# Patient Record
Sex: MALE | Race: WHITE
[De-identification: names, ages, dates, MRNs, and addresses within clinical notes are randomized per-mention and may not be internally consistent; named-entity substitution may affect disease eponyms.]

---

## 2018-01-08 ENCOUNTER — HOSPITAL ENCOUNTER (OUTPATIENT)
Dept: HOSPITAL 90 - SHCH | Age: 83
Discharge: HOME | End: 2018-01-08
Attending: INTERNAL MEDICINE
Payer: MEDICARE

## 2018-01-08 DIAGNOSIS — I35.0: Primary | ICD-10-CM

## 2018-01-08 PROCEDURE — 93306 TTE W/DOPPLER COMPLETE: CPT

## 2019-01-22 ENCOUNTER — HOSPITAL ENCOUNTER (OUTPATIENT)
Dept: HOSPITAL 90 - SHCH | Age: 84
Discharge: HOME | End: 2019-01-22
Attending: INTERNAL MEDICINE
Payer: MEDICARE

## 2019-01-22 DIAGNOSIS — I35.0: Primary | ICD-10-CM

## 2019-01-22 DIAGNOSIS — I51.7: ICD-10-CM

## 2019-01-22 DIAGNOSIS — I70.0: ICD-10-CM

## 2019-01-22 PROCEDURE — 93306 TTE W/DOPPLER COMPLETE: CPT

## 2019-04-03 VITALS — SYSTOLIC BLOOD PRESSURE: 105 MMHG | DIASTOLIC BLOOD PRESSURE: 51 MMHG

## 2019-04-03 LAB
APTT PPP: 26.1 SEC (ref 26.3–35.5)
BASOPHILS NFR BLD AUTO: 0.4 % (ref 0–5)
BUN SERPL-MCNC: 11 MG/DL (ref 7–18)
CHLORIDE SERPL-SCNC: 104 MMOL/L (ref 101–111)
CO2 SERPL-SCNC: 30 MMOL/L (ref 21–32)
CREAT SERPL-MCNC: 0.9 MG/DL (ref 0.5–1.5)
EOSINOPHIL NFR BLD AUTO: 3.9 % (ref 0–8)
ERYTHROCYTE [DISTWIDTH] IN BLOOD BY AUTOMATED COUNT: 13.1 % (ref 11–15.5)
GFR SERPL CREATININE-BSD FRML MDRD: 86 ML/MIN (ref 60–?)
GLUCOSE SERPL-MCNC: 214 MG/DL (ref 70–105)
GLUCOSE UR STRIP-MCNC: 250 MG/DL
HCT VFR BLD AUTO: 41 % (ref 42–54)
INR PPP: 1.02 (ref 0.85–1.15)
LYMPHOCYTES NFR SPEC AUTO: 24.2 % (ref 21–51)
MCH RBC QN AUTO: 33.7 PG (ref 27–33)
MCHC RBC AUTO-ENTMCNC: 33.6 G/DL (ref 32–36)
MCV RBC AUTO: 100.3 FL (ref 79–99)
MONOCYTES NFR BLD AUTO: 7.2 % (ref 3–13)
NEUTROPHILS NFR BLD AUTO: 64.3 % (ref 40–77)
NRBC BLD MANUAL-RTO: 0 % (ref 0–0.19)
PH UR STRIP: 6 [PH] (ref 5–8)
PLATELET # BLD AUTO: 237 K/UL (ref 130–400)
POTASSIUM SERPL-SCNC: 4.1 MMOL/L (ref 3.5–5.1)
PROTHROMBIN TIME: 10.7 SEC (ref 9.6–11.6)
RBC # BLD AUTO: 4.08 MIL/UL (ref 4.5–6.2)
RBC #/AREA URNS HPF: (no result) /HPF (ref 0–1)
SODIUM SERPL-SCNC: 141 MMOL/L (ref 136–145)
SP GR UR STRIP: 1.01 (ref 1–1.03)
UROBILINOGEN UR STRIP-MCNC: 0.2 MG/DL (ref 0.2–1)
WBC # BLD AUTO: 6.8 K/UL (ref 4.8–10.8)
WBC #/AREA URNS HPF: (no result) /HPF (ref 0–1)

## 2019-04-05 ENCOUNTER — HOSPITAL ENCOUNTER (OUTPATIENT)
Dept: HOSPITAL 90 - DAH | Age: 84
Discharge: HOME | End: 2019-04-05
Attending: INTERNAL MEDICINE
Payer: MEDICARE

## 2019-04-05 VITALS — DIASTOLIC BLOOD PRESSURE: 54 MMHG | SYSTOLIC BLOOD PRESSURE: 115 MMHG

## 2019-04-05 VITALS — DIASTOLIC BLOOD PRESSURE: 63 MMHG | SYSTOLIC BLOOD PRESSURE: 119 MMHG

## 2019-04-05 VITALS — SYSTOLIC BLOOD PRESSURE: 114 MMHG | DIASTOLIC BLOOD PRESSURE: 54 MMHG

## 2019-04-05 VITALS — SYSTOLIC BLOOD PRESSURE: 125 MMHG | DIASTOLIC BLOOD PRESSURE: 56 MMHG

## 2019-04-05 VITALS — SYSTOLIC BLOOD PRESSURE: 120 MMHG | DIASTOLIC BLOOD PRESSURE: 57 MMHG

## 2019-04-05 VITALS — SYSTOLIC BLOOD PRESSURE: 115 MMHG | DIASTOLIC BLOOD PRESSURE: 57 MMHG

## 2019-04-05 VITALS — SYSTOLIC BLOOD PRESSURE: 118 MMHG | DIASTOLIC BLOOD PRESSURE: 60 MMHG

## 2019-04-05 VITALS — SYSTOLIC BLOOD PRESSURE: 110 MMHG | DIASTOLIC BLOOD PRESSURE: 56 MMHG

## 2019-04-05 VITALS — WEIGHT: 166.2 LBS | BODY MASS INDEX: 26.09 KG/M2 | HEIGHT: 67 IN

## 2019-04-05 VITALS — SYSTOLIC BLOOD PRESSURE: 123 MMHG | DIASTOLIC BLOOD PRESSURE: 56 MMHG

## 2019-04-05 VITALS — DIASTOLIC BLOOD PRESSURE: 61 MMHG | SYSTOLIC BLOOD PRESSURE: 130 MMHG

## 2019-04-05 VITALS — DIASTOLIC BLOOD PRESSURE: 63 MMHG | SYSTOLIC BLOOD PRESSURE: 125 MMHG

## 2019-04-05 VITALS — DIASTOLIC BLOOD PRESSURE: 66 MMHG | SYSTOLIC BLOOD PRESSURE: 137 MMHG

## 2019-04-05 DIAGNOSIS — E78.5: ICD-10-CM

## 2019-04-05 DIAGNOSIS — Z79.01: ICD-10-CM

## 2019-04-05 DIAGNOSIS — Z79.899: ICD-10-CM

## 2019-04-05 DIAGNOSIS — I25.118: Primary | ICD-10-CM

## 2019-04-05 DIAGNOSIS — I35.0: ICD-10-CM

## 2019-04-05 DIAGNOSIS — Z95.5: ICD-10-CM

## 2019-04-05 DIAGNOSIS — I25.5: ICD-10-CM

## 2019-04-05 DIAGNOSIS — F17.210: ICD-10-CM

## 2019-04-05 DIAGNOSIS — Z98.890: ICD-10-CM

## 2019-04-05 PROCEDURE — 93005 ELECTROCARDIOGRAM TRACING: CPT

## 2019-04-05 PROCEDURE — 80048 BASIC METABOLIC PNL TOTAL CA: CPT

## 2019-04-05 PROCEDURE — 85730 THROMBOPLASTIN TIME PARTIAL: CPT

## 2019-04-05 PROCEDURE — 81001 URINALYSIS AUTO W/SCOPE: CPT

## 2019-04-05 PROCEDURE — 71045 X-RAY EXAM CHEST 1 VIEW: CPT

## 2019-04-05 PROCEDURE — 36415 COLL VENOUS BLD VENIPUNCTURE: CPT

## 2019-04-05 PROCEDURE — 85610 PROTHROMBIN TIME: CPT

## 2019-04-05 PROCEDURE — 99157 MOD SED OTHER PHYS/QHP EA: CPT

## 2019-04-05 PROCEDURE — 85025 COMPLETE CBC W/AUTO DIFF WBC: CPT

## 2019-04-05 PROCEDURE — 99156 MOD SED OTH PHYS/QHP 5/>YRS: CPT

## 2019-04-05 PROCEDURE — 93460 R&L HRT ART/VENTRICLE ANGIO: CPT

## 2019-04-05 NOTE — NUR
RESUME

RESUMED CARE OF PATIENT FROM CRYSTAL PARMAR RN. PT LYING IN BED, NO DISTRESS NOTED. DSTAT 
DRESSING DRY AND INTACT TO RIGHT GROIN

## 2019-04-05 NOTE — NUR
DC

DC INSTRUCTIONS GIVEN TO PT/PTS WIFE , INSTRUCTED TO F/U WITH DR. QUIROGA , AND [place] [facility] [place] AT 10 AM FOR CTA/TAVR PROTOCOL, TO  KEEP NPO AFTER [place] ON 4-9-19 FOR 
PROCEDURE. , PIV REMOVED TO LEFT ARM. SMALL BRUISING NOTED TO ARM AFTER PRESSURED APPLIED TO 
SITE.  PT / SPOUSE VERBALIZED UNDERSTANDING. RIGHT GROIN DSTAT DRESSING DRY AND INTACT,

## 2019-04-10 ENCOUNTER — HOSPITAL ENCOUNTER (OUTPATIENT)
Dept: HOSPITAL 90 - RAH | Age: 84
Discharge: HOME | End: 2019-04-10
Attending: INTERNAL MEDICINE
Payer: MEDICARE

## 2019-04-10 DIAGNOSIS — N32.9: ICD-10-CM

## 2019-04-10 DIAGNOSIS — E04.9: ICD-10-CM

## 2019-04-10 DIAGNOSIS — Z90.49: ICD-10-CM

## 2019-04-10 DIAGNOSIS — I70.0: ICD-10-CM

## 2019-04-10 DIAGNOSIS — N40.0: Primary | ICD-10-CM

## 2019-04-10 PROCEDURE — 74174 CTA ABD&PLVS W/CONTRAST: CPT

## 2019-04-10 PROCEDURE — 75574 CT ANGIO HRT W/3D IMAGE: CPT

## 2019-07-25 ENCOUNTER — HOSPITAL ENCOUNTER (OUTPATIENT)
Dept: HOSPITAL 90 - EDH | Age: 84
Setting detail: OBSERVATION
LOS: 1 days | Discharge: HOME | End: 2019-07-26
Attending: INTERNAL MEDICINE | Admitting: INTERNAL MEDICINE
Payer: MEDICARE

## 2019-07-25 VITALS — SYSTOLIC BLOOD PRESSURE: 116 MMHG | DIASTOLIC BLOOD PRESSURE: 67 MMHG

## 2019-07-25 VITALS — WEIGHT: 158.1 LBS | HEIGHT: 68 IN | BODY MASS INDEX: 23.96 KG/M2

## 2019-07-25 VITALS — SYSTOLIC BLOOD PRESSURE: 111 MMHG | DIASTOLIC BLOOD PRESSURE: 53 MMHG

## 2019-07-25 DIAGNOSIS — Z96.1: ICD-10-CM

## 2019-07-25 DIAGNOSIS — N40.1: ICD-10-CM

## 2019-07-25 DIAGNOSIS — Z79.899: ICD-10-CM

## 2019-07-25 DIAGNOSIS — Z87.891: ICD-10-CM

## 2019-07-25 DIAGNOSIS — E07.9: ICD-10-CM

## 2019-07-25 DIAGNOSIS — I25.10: ICD-10-CM

## 2019-07-25 DIAGNOSIS — H93.13: ICD-10-CM

## 2019-07-25 DIAGNOSIS — C67.4: Primary | ICD-10-CM

## 2019-07-25 DIAGNOSIS — H04.123: ICD-10-CM

## 2019-07-25 DIAGNOSIS — Z97.4: ICD-10-CM

## 2019-07-25 DIAGNOSIS — I35.0: ICD-10-CM

## 2019-07-25 LAB — HCT VFR BLD AUTO: 23 % (ref 42–54)

## 2019-07-25 PROCEDURE — 99284 EMERGENCY DEPT VISIT MOD MDM: CPT

## 2019-07-25 PROCEDURE — 86922 COMPATIBILITY TEST ANTIGLOB: CPT

## 2019-07-25 PROCEDURE — 86900 BLOOD TYPING SEROLOGIC ABO: CPT

## 2019-07-25 PROCEDURE — 86901 BLOOD TYPING SEROLOGIC RH(D): CPT

## 2019-07-25 PROCEDURE — 85014 HEMATOCRIT: CPT

## 2019-07-25 PROCEDURE — 85027 COMPLETE CBC AUTOMATED: CPT

## 2019-07-25 PROCEDURE — 86850 RBC ANTIBODY SCREEN: CPT

## 2019-07-25 PROCEDURE — 36415 COLL VENOUS BLD VENIPUNCTURE: CPT

## 2019-07-25 PROCEDURE — 36430 TRANSFUSION BLD/BLD COMPNT: CPT

## 2019-07-25 PROCEDURE — 85018 HEMOGLOBIN: CPT

## 2019-07-26 VITALS — SYSTOLIC BLOOD PRESSURE: 106 MMHG | DIASTOLIC BLOOD PRESSURE: 58 MMHG

## 2019-07-26 VITALS — DIASTOLIC BLOOD PRESSURE: 64 MMHG | SYSTOLIC BLOOD PRESSURE: 105 MMHG

## 2019-07-26 VITALS — SYSTOLIC BLOOD PRESSURE: 106 MMHG | DIASTOLIC BLOOD PRESSURE: 60 MMHG

## 2019-07-26 VITALS — SYSTOLIC BLOOD PRESSURE: 109 MMHG | DIASTOLIC BLOOD PRESSURE: 56 MMHG

## 2019-07-26 LAB
ERYTHROCYTE [DISTWIDTH] IN BLOOD BY AUTOMATED COUNT: 15 % (ref 11–15.5)
HCT VFR BLD AUTO: 29.8 % (ref 42–54)
MCH RBC QN AUTO: 32.7 PG (ref 27–33)
MCHC RBC AUTO-ENTMCNC: 35.2 G/DL (ref 32–36)
MCV RBC AUTO: 93 FL (ref 79–99)
NRBC BLD MANUAL-RTO: 0 % (ref 0–0.19)
PLAT MORPH BLD: (no result)
PLATELET # BLD AUTO: 50 K/UL (ref 130–400)
RBC # BLD AUTO: 3.2 MIL/UL (ref 4.5–6.2)
WBC # BLD AUTO: 3.7 K/UL (ref 4.8–10.8)

## 2019-07-26 NOTE — NUR
DISCHARGE INSTRUCTIONS GIVEN. PATIENT IV DISCONTINUED WITH INNER CANNULA INTACT / ALL 
QUESTIONS ANSWERED. PATIENT TO FOLLOW UP WITH DR. ADAMS.

## 2020-04-09 ENCOUNTER — HOSPITAL ENCOUNTER (OUTPATIENT)
Dept: HOSPITAL 90 - SHCH | Age: 85
Discharge: HOME | End: 2020-04-09
Attending: INTERNAL MEDICINE
Payer: MEDICARE

## 2020-04-09 DIAGNOSIS — I35.0: ICD-10-CM

## 2020-04-09 DIAGNOSIS — Z95.2: Primary | ICD-10-CM

## 2020-04-09 PROCEDURE — 93306 TTE W/DOPPLER COMPLETE: CPT

## 2020-05-05 ENCOUNTER — HOSPITAL ENCOUNTER (OUTPATIENT)
Dept: HOSPITAL 90 - RAH | Age: 85
Discharge: HOME | End: 2020-05-05
Attending: UROLOGY
Payer: MEDICARE

## 2020-05-05 DIAGNOSIS — K57.30: ICD-10-CM

## 2020-05-05 DIAGNOSIS — K76.0: Primary | ICD-10-CM

## 2020-05-05 DIAGNOSIS — I70.0: ICD-10-CM

## 2020-05-05 DIAGNOSIS — N32.89: ICD-10-CM

## 2020-05-05 DIAGNOSIS — Z85.51: ICD-10-CM

## 2020-05-05 DIAGNOSIS — R91.1: ICD-10-CM

## 2020-05-05 DIAGNOSIS — M47.819: ICD-10-CM

## 2020-05-05 PROCEDURE — 74177 CT ABD & PELVIS W/CONTRAST: CPT

## 2020-10-16 ENCOUNTER — HOSPITAL ENCOUNTER (OUTPATIENT)
Dept: HOSPITAL 90 - SHCH | Age: 85
Discharge: HOME | End: 2020-10-16
Attending: INTERNAL MEDICINE
Payer: MEDICARE

## 2020-10-16 DIAGNOSIS — I10: ICD-10-CM

## 2020-10-16 DIAGNOSIS — I34.0: Primary | ICD-10-CM

## 2020-10-16 PROCEDURE — 93356 MYOCRD STRAIN IMG SPCKL TRCK: CPT

## 2020-10-16 PROCEDURE — 93306 TTE W/DOPPLER COMPLETE: CPT

## 2020-10-27 LAB
APTT PPP: 23.2 SEC (ref 26.3–35.5)
BASOPHILS NFR BLD AUTO: 0.3 % (ref 0–5)
BUN SERPL-MCNC: 10 MG/DL (ref 7–18)
CHLORIDE SERPL-SCNC: 105 MMOL/L (ref 101–111)
CO2 SERPL-SCNC: 31 MMOL/L (ref 21–32)
CREAT SERPL-MCNC: 1.1 MG/DL (ref 0.5–1.5)
EOSINOPHIL NFR BLD AUTO: 2.5 % (ref 0–8)
ERYTHROCYTE [DISTWIDTH] IN BLOOD BY AUTOMATED COUNT: 12.5 % (ref 11–15.5)
GFR SERPL CREATININE-BSD FRML MDRD: 68 ML/MIN (ref 60–?)
GLUCOSE SERPL-MCNC: 186 MG/DL (ref 70–105)
HCT VFR BLD AUTO: 38.1 % (ref 42–54)
INR PPP: 1.01 (ref 0.85–1.15)
LYMPHOCYTES NFR SPEC AUTO: 26.4 % (ref 21–51)
MCH RBC QN AUTO: 34.4 PG (ref 27–33)
MCHC RBC AUTO-ENTMCNC: 33.9 G/DL (ref 32–36)
MCV RBC AUTO: 101.6 FL (ref 79–99)
MONOCYTES NFR BLD AUTO: 7.6 % (ref 3–13)
NEUTROPHILS NFR BLD AUTO: 62.9 % (ref 40–77)
NRBC BLD MANUAL-RTO: 0 % (ref 0–0.19)
PLATELET # BLD AUTO: 156 K/UL (ref 130–400)
POTASSIUM SERPL-SCNC: 4.1 MMOL/L (ref 3.5–5.1)
PROTHROMBIN TIME: 10.9 SEC (ref 9.6–11.6)
RBC # BLD AUTO: 3.75 MIL/UL (ref 4.5–6.2)
SODIUM SERPL-SCNC: 142 MMOL/L (ref 136–145)
WBC # BLD AUTO: 6.3 K/UL (ref 4.8–10.8)

## 2020-10-30 ENCOUNTER — HOSPITAL ENCOUNTER (OUTPATIENT)
Dept: HOSPITAL 90 - DAH | Age: 85
Discharge: HOME | End: 2020-10-30
Attending: INTERNAL MEDICINE
Payer: MEDICARE

## 2020-10-30 VITALS — SYSTOLIC BLOOD PRESSURE: 129 MMHG | DIASTOLIC BLOOD PRESSURE: 55 MMHG

## 2020-10-30 VITALS — HEIGHT: 67 IN | BODY MASS INDEX: 25.55 KG/M2 | WEIGHT: 162.8 LBS

## 2020-10-30 VITALS — SYSTOLIC BLOOD PRESSURE: 151 MMHG | DIASTOLIC BLOOD PRESSURE: 58 MMHG

## 2020-10-30 VITALS — DIASTOLIC BLOOD PRESSURE: 60 MMHG | SYSTOLIC BLOOD PRESSURE: 123 MMHG

## 2020-10-30 VITALS — DIASTOLIC BLOOD PRESSURE: 68 MMHG | SYSTOLIC BLOOD PRESSURE: 129 MMHG

## 2020-10-30 VITALS — SYSTOLIC BLOOD PRESSURE: 118 MMHG | DIASTOLIC BLOOD PRESSURE: 52 MMHG

## 2020-10-30 VITALS — SYSTOLIC BLOOD PRESSURE: 110 MMHG | DIASTOLIC BLOOD PRESSURE: 53 MMHG

## 2020-10-30 VITALS — DIASTOLIC BLOOD PRESSURE: 56 MMHG | SYSTOLIC BLOOD PRESSURE: 150 MMHG

## 2020-10-30 VITALS — DIASTOLIC BLOOD PRESSURE: 57 MMHG | SYSTOLIC BLOOD PRESSURE: 128 MMHG

## 2020-10-30 VITALS — DIASTOLIC BLOOD PRESSURE: 63 MMHG | SYSTOLIC BLOOD PRESSURE: 139 MMHG

## 2020-10-30 VITALS — SYSTOLIC BLOOD PRESSURE: 120 MMHG | DIASTOLIC BLOOD PRESSURE: 54 MMHG

## 2020-10-30 VITALS — SYSTOLIC BLOOD PRESSURE: 114 MMHG | DIASTOLIC BLOOD PRESSURE: 54 MMHG

## 2020-10-30 DIAGNOSIS — Z98.890: ICD-10-CM

## 2020-10-30 DIAGNOSIS — Z80.9: ICD-10-CM

## 2020-10-30 DIAGNOSIS — Z82.49: ICD-10-CM

## 2020-10-30 DIAGNOSIS — Z79.899: ICD-10-CM

## 2020-10-30 DIAGNOSIS — Z83.3: ICD-10-CM

## 2020-10-30 DIAGNOSIS — I50.42: ICD-10-CM

## 2020-10-30 DIAGNOSIS — Z79.01: ICD-10-CM

## 2020-10-30 DIAGNOSIS — Z92.21: ICD-10-CM

## 2020-10-30 DIAGNOSIS — Z90.49: ICD-10-CM

## 2020-10-30 DIAGNOSIS — Z85.51: ICD-10-CM

## 2020-10-30 DIAGNOSIS — Z79.82: ICD-10-CM

## 2020-10-30 DIAGNOSIS — I25.5: Primary | ICD-10-CM

## 2020-10-30 DIAGNOSIS — E78.5: ICD-10-CM

## 2020-10-30 DIAGNOSIS — Z90.89: ICD-10-CM

## 2020-10-30 PROCEDURE — 99156 MOD SED OTH PHYS/QHP 5/>YRS: CPT

## 2020-10-30 PROCEDURE — 71045 X-RAY EXAM CHEST 1 VIEW: CPT

## 2020-10-30 PROCEDURE — 33249 INSJ/RPLCMT DEFIB W/LEAD(S): CPT

## 2020-10-30 PROCEDURE — 85025 COMPLETE CBC W/AUTO DIFF WBC: CPT

## 2020-10-30 PROCEDURE — 33207 INSERT HEART PM VENTRICULAR: CPT

## 2020-10-30 PROCEDURE — 80048 BASIC METABOLIC PNL TOTAL CA: CPT

## 2020-10-30 PROCEDURE — 93005 ELECTROCARDIOGRAM TRACING: CPT

## 2020-10-30 PROCEDURE — 36415 COLL VENOUS BLD VENIPUNCTURE: CPT

## 2020-10-30 PROCEDURE — 85610 PROTHROMBIN TIME: CPT

## 2020-10-30 PROCEDURE — 99157 MOD SED OTHER PHYS/QHP EA: CPT

## 2020-10-30 PROCEDURE — 85730 THROMBOPLASTIN TIME PARTIAL: CPT

## 2020-10-30 NOTE — NUR
preop



pt arrived ambulatory in no distress. pt oriented to room and call light.  pt has multiple 
bruising to arms.  pt connected to cardiac monitor.  will continue to monitor pt

## 2020-10-30 NOTE — NUR
1945, REPORT GIVEN TO BRICE ESCUDERO , PT TRANSFERED IN BED WITH PERSONAL BELONGINGS. PT 
STABLE, NO DISTRESS. LT UPPER CHEST PRESSURE DRESSING D/I. NO ACTIVE BLEEDING OR HEMATOMA.

## 2020-10-30 NOTE — NUR
dc status

dcd as ordered post x-ray of chest normal as read by radiologist. piv to left ac dcd 
aseptically,cath completely out ,with good hemostasis. pressure over left chest 
dry/intact,sling on ,with instructions given as per activity,appointments and reiterated to 
proceed to ER if necessary. dc documents including prescription endorsed to pt with 
instructions, pt verbalized understanding, v/s stable afebrile ,denies acute pain or 
discomfort. escorted to lobby per w/c by Mohit

-------------------------------------------------------------------------------

Addendum: 10/30/20 at 2322 by KINJAL EM RN RN

-------------------------------------------------------------------------------

Amended: Links added.

## 2020-10-30 NOTE — NUR
POST CATH



RECEIVED PT AND REPORT FROM GALILEO TINAJERO RN.  PT IN LOW FOWLERS.  PT IN NO DISTRESS AT 
THIS TIME.  APPLIED ICE PACK TO SHOULDER.  PT CONNECTED TO CARDIAC MONITOR.  WILL CONTINUE 
TO MONITOR PT

## 2020-12-24 ENCOUNTER — HOSPITAL ENCOUNTER (EMERGENCY)
Dept: HOSPITAL 90 - EDH | Age: 85
Discharge: HOME | End: 2020-12-24
Payer: MEDICARE

## 2020-12-24 DIAGNOSIS — Z87.891: ICD-10-CM

## 2020-12-24 DIAGNOSIS — H81.10: Primary | ICD-10-CM

## 2020-12-24 PROCEDURE — 70450 CT HEAD/BRAIN W/O DYE: CPT

## 2024-12-03 ENCOUNTER — HOSPITAL ENCOUNTER (OUTPATIENT)
Dept: HOSPITAL 90 - SHCH | Age: 89
Discharge: HOME | End: 2024-12-03
Attending: INTERNAL MEDICINE
Payer: COMMERCIAL

## 2024-12-03 DIAGNOSIS — I08.1: Primary | ICD-10-CM

## 2024-12-03 DIAGNOSIS — Z95.0: ICD-10-CM

## 2024-12-03 DIAGNOSIS — E78.5: ICD-10-CM

## 2024-12-03 DIAGNOSIS — Z95.2: ICD-10-CM

## 2024-12-03 DIAGNOSIS — Z95.1: ICD-10-CM

## 2024-12-03 DIAGNOSIS — I11.9: ICD-10-CM

## 2024-12-03 PROCEDURE — 93306 TTE W/DOPPLER COMPLETE: CPT

## 2024-12-04 ENCOUNTER — HOSPITAL ENCOUNTER (OUTPATIENT)
Dept: HOSPITAL 90 - SHCH | Age: 89
Discharge: HOME | End: 2024-12-04
Attending: INTERNAL MEDICINE
Payer: COMMERCIAL

## 2024-12-04 DIAGNOSIS — I70.203: Primary | ICD-10-CM

## 2024-12-04 PROCEDURE — 93925 LOWER EXTREMITY STUDY: CPT

## 2024-12-04 NOTE — [facility]CSR
--------------- APPROVED REPORT --------------





EXAM: Two-dimensional and M-mode echocardiogram with Doppler and color Doppler.



INDICATION

ICD:  z95.2



Surgery/Intervention

Pacemaker: Date: 2010

CABG: Date: 2019 



RISK FACTORS

Hypertension 

Hyperlipidemia



2D Dimensions

RVDd4.3 cmLVEF(%)35.1 (>50%)LVED Vol(simp.)138.0 mL

IVSd0.9 (0.7-1.1cm)FS(%)17 %LVES Vol(simp.)87.0 mL

LVDd4.7 (3.8-5.6cm)LA (2D)3.4 (1.6-4.0cm)LVEF(%, simp.)37 %

PWd1.0 (0.7-1.1cm)IVC diam1.5 cmLA ESV INDEX (BP)30.52 mL/m2

LVDs4.0 (2.5-4.0cm)



Aortic Valve

AoV Vmax2.4 m/Joselo Peak GR22.4 mmHgLVOT Vmax1.0 m/s

AoV VTI0.6 mAo Mean GR14.3 mmHgLVOT VTI0.21 m



Mitral Valve

MV E Vvet900.3 cm/sDECEL Wyik670 msMV Peak GR6 mmHg

MV A Fadf460.8 cm/sP 1/2 T42 msMV Mean GR2 mmHg

E/A ratio0.9MVA (PHT)5.2 cm2MR CCN055 cm2

MR Max  mmHgMR Mean PG52 mmHg



TDI

E/E' Zgkyhd64.6E/E' Peplsxp53.4



Pulmonary Valve

PV Vmax0.8 m/sPV VTI0.17 mPV Mean GR2 mmHg

PV Peak GR2.7 mmHgPI End Gayla. Marcos 1.1 cm/s



Tricuspid Valve

TR Vmax3.2 m/sRAP (EST) 3 [id number] mmHg

TR Peak GR40.0 mmHg



Left Ventricle

Left ventricular cavity size is normal. Moderate global hypokinesis There is normal left ventricular 
wall thickness. LVEF is 30-35%. Stage II, diastolic dysfunction.



Right Ventricle

The right ventricle is dilated. The right ventricular systolic function is normal.



Atria

The left atrium size is normal. The right atrium is dilated.



Aortic Valve

Status post TAVR. No aortic regurgitation is present. AV Dimensionless Index is 0.38 Calculated aorti
c valve maximum pressure gradient of 22.4 mmHg and mean pressure gradient of 14.3 mmHg.



Mitral Valve

Mitral annular calcification is mild to moderate. Mitral valve leaflets are mildly thickened. Mitral 
regurgitation is mild. There is no mitral valve stenosis.



Tricuspid Valve

The tricuspid valve leaflets appear normal. There is mild to moderate tricuspid regurgitation. Right 
ventricular systolic pressure is estimated at 40-50 mmHg. 



Pulmonic Valve

Pulmonic valve is not well visualized. There is trace pulmonic valvular regurgitation.



Great Vessels

The aortic root is not well visualized but is probably normal size. The IVC is normal in size and col
lapses >50% with inspiration.



Pericardium

Trival pericardial effusion.



Conclusion

LVEF is 30-35%.

Stage II, diastolic dysfunction.

Moderate global hypokinesis

Status post TAVR.

Calculated aortic valve maximum pressure gradient of 22.4 mmHg and mean pressure gradient of 14.3 mmH
g.

There is mild to moderate tricuspid regurgitation.

Right ventricular systolic pressure is estimated at 40-50 mmHg.

## 2024-12-05 NOTE — [facility]CS[facility]
--------------- APPROVED REPORT --------------





Laterality:   Bilateral



VELOCITY AND DOPPLER WAVEFORM ANALYSIS

CFA (R) 270.8cm/sec, Monophasic, Moderate > 50%CFA (L) 121.5cm/sec, Biphasic, 

Prof Fem Art. (R) 20.2cm/sec, Monophasic, Prof Fem Art. (L) 41.4cm/sec, Biphasic, 

Fem Art Prox. (R) 28.7cm/sec, Monophasic, Fem Art Prox. (L) 70.4cm/sec, Biphasic, 

Fem Art Mid. (R) 26.0cm/sec, Monophasic, Fem Art Mid. (L) 74.5cm/sec, Biphasic, 

Fem Art Dist (R) 20.2cm/sec, Monophasic, Fem Art Dist. (L) 55.2cm/sec, Biphasic, 

Pop Art(AK) (R) 131.1cm/sec, Monophasic, Pop Art (AK) (L) 51.1cm/sec, Biphasic, 

Pop Art (Fossa)(R) 30.4cm/sec, Monophasic, Pop Art (Fossa) (L) 33.2cm/sec, Biphasic, 

Pop Art(BK) (R) 19.0cm/sec, Monophasic, Pop Art (BK) (L) 31.9cm/sec, Biphasic, 

PTA Prox. (R) 46.7cm/sec, Monophasic, PTA Prox. (L) 104.4cm/sec, Biphasic, 

PTA Mid. (R) cm/sec, Occluded, PTA Mid. (L) 47.3cm/sec, Biphasic, 

PTA Dist. (R) cm/sec, Occluded, PTA Dist. (L) 47.3cm/sec, Biphasic, 

Per Art Dist. (R) 28.7cm/sec, Monophasic, Per Art Dist. (L) 16.3cm/sec, Monophasic, 

CRISTOBAL Prox. (R) 22.0cm/sec, Monophasic, CRISTOBAL Prox. (L) 57.1cm/sec, Biphasic, 

CRISTOBAL Mid. (R) 23.8cm/sec, Monophasic CRISTOBAL Mid. (L) 203.4cm/sec, Biphasic, Moderate > 50%

CRISTOBAL Dist. (R) 23.8cm/sec, Monophasic, CRISTOBAL Dist. (L) 38.3cm/sec, Biphasic, 



Technologist Impression

Diffuse atherosclerosis throughout the bilateral lower extremities. 



There is evidence of moderate stenosis in the right common femoral artery with monophasic flow distal
ly. 

The right posterior tibial artery appears occluded. 



There is evidence of moderate stenossis in the left anterior tibial artery. 



Conclusion

Hemodynamically significant peripheral arterial disease bilaterally

Consider formal angiography if clinically indicated





Conclusion

Hemodynamically significant peripheral arterial disease bilaterally

Consider formal angiography if clinically indicated

## 2025-02-13 ENCOUNTER — HOSPITAL ENCOUNTER (OUTPATIENT)
Dept: HOSPITAL 90 - LAB | Age: 89
Discharge: HOME | End: 2025-02-13
Attending: INTERNAL MEDICINE
Payer: COMMERCIAL

## 2025-02-13 DIAGNOSIS — R42: Primary | ICD-10-CM

## 2025-02-13 DIAGNOSIS — R20.2: ICD-10-CM

## 2025-02-13 LAB
ALBUMIN SERPL-MCNC: 3.2 G/DL (ref 3.5–5)
ALT SERPL-CCNC: 31 U/L (ref 12–78)
AST SERPL-CCNC: 30 U/L (ref 10–37)
BASOPHILS # BLD AUTO: 0.02 K/UL (ref 0–0.2)
BASOPHILS NFR BLD AUTO: 0.3 % (ref 0–5)
BILIRUB SERPL-MCNC: 0.6 MG/DL (ref 0.2–1)
BUN SERPL-MCNC: 13 MG/DL (ref 7–18)
CHLORIDE SERPL-SCNC: 104 MMOL/L (ref 101–111)
CO2 SERPL-SCNC: 31 MMOL/L (ref 21–32)
CREAT SERPL-MCNC: 0.9 MG/DL (ref 0.5–1.3)
EOSINOPHIL # BLD AUTO: 0.14 K/UL (ref 0–0.7)
EOSINOPHIL NFR BLD AUTO: 1.9 % (ref 0–8)
ERYTHROCYTE [DISTWIDTH] IN BLOOD BY AUTOMATED COUNT: 13.2 % (ref 11–15.5)
GFR SERPL CREATININE-BSD FRML MDRD: 83 ML/MIN (ref 90–?)
GLUCOSE SERPL-MCNC: 273 MG/DL (ref 70–105)
HCT VFR BLD AUTO: 40.6 % (ref 42–54)
IMM GRANULOCYTES # BLD: 0.04 K/UL (ref 0–1)
LYMPHOCYTES # SPEC AUTO: 1.4 K/UL (ref 1–4.8)
LYMPHOCYTES NFR SPEC AUTO: 19.6 % (ref 21–51)
MCH RBC QN AUTO: 34.4 PG (ref 27–33)
MCHC RBC AUTO-ENTMCNC: 32.5 G/DL (ref 32–36)
MCV RBC AUTO: 105.7 FL (ref 79–99)
MONOCYTES # BLD AUTO: 0.6 K/UL (ref 0.1–1)
MONOCYTES NFR BLD AUTO: 8.1 % (ref 3–13)
NEUTROPHILS # BLD AUTO: 5.1 K/UL (ref 1.8–7.7)
NEUTROPHILS NFR BLD AUTO: 69.6 % (ref 40–77)
NRBC BLD MANUAL-RTO: 0 % (ref 0–0.19)
PLATELET # BLD AUTO: 187 K/UL (ref 130–400)
POTASSIUM SERPL-SCNC: 4.4 MMOL/L (ref 3.5–5.1)
PROT SERPL-MCNC: 6.3 G/DL (ref 6–8.3)
RBC # BLD AUTO: 3.84 MIL/UL (ref 4.5–6.2)
RBC MORPH BLD: (no result)
SODIUM SERPL-SCNC: 140 MMOL/L (ref 136–145)
WBC # BLD AUTO: 7.3 K/UL (ref 4.8–10.8)

## 2025-02-13 PROCEDURE — 85025 COMPLETE CBC W/AUTO DIFF WBC: CPT

## 2025-02-13 PROCEDURE — 36415 COLL VENOUS BLD VENIPUNCTURE: CPT

## 2025-02-13 PROCEDURE — 80053 COMPREHEN METABOLIC PANEL: CPT
